# Patient Record
Sex: MALE
[De-identification: names, ages, dates, MRNs, and addresses within clinical notes are randomized per-mention and may not be internally consistent; named-entity substitution may affect disease eponyms.]

---

## 2021-03-02 PROBLEM — Z00.00 ENCOUNTER FOR PREVENTIVE HEALTH EXAMINATION: Status: ACTIVE | Noted: 2021-03-02

## 2021-03-10 ENCOUNTER — APPOINTMENT (OUTPATIENT)
Dept: SURGERY | Facility: CLINIC | Age: 31
End: 2021-03-10
Payer: MEDICAID

## 2021-03-10 ENCOUNTER — TRANSCRIPTION ENCOUNTER (OUTPATIENT)
Age: 31
End: 2021-03-10

## 2021-03-10 ENCOUNTER — LABORATORY RESULT (OUTPATIENT)
Age: 31
End: 2021-03-10

## 2021-03-10 VITALS
BODY MASS INDEX: 24.62 KG/M2 | HEART RATE: 71 BPM | WEIGHT: 198 LBS | HEIGHT: 75 IN | DIASTOLIC BLOOD PRESSURE: 70 MMHG | SYSTOLIC BLOOD PRESSURE: 122 MMHG | TEMPERATURE: 97.8 F

## 2021-03-10 DIAGNOSIS — R22.31 LOCALIZED SWELLING, MASS AND LUMP, RIGHT UPPER LIMB: ICD-10-CM

## 2021-03-10 PROCEDURE — 24075 EXC ARM/ELBOW LES SC < 3 CM: CPT

## 2021-03-10 PROCEDURE — 99072 ADDL SUPL MATRL&STAF TM PHE: CPT

## 2021-03-10 PROCEDURE — 99202 OFFICE O/P NEW SF 15 MIN: CPT | Mod: 57

## 2021-03-10 NOTE — ASSESSMENT
[FreeTextEntry1] : Eric is a 30 year old man who comes to the office with right forearm mass. The mass has been there for many years. Its not bothering him. He states he has a family history of cancer. His father and uncle had melanoma. His sister had lymphoma. \par \par impression : 2x1 cm mass mobile. \par \par We explained in great detail the pathophysiology of the disease process. We serg diagrams and discussed the workup for diagnosis and management.\par The various options were explained to the patient. The Risk , benefit and alternatives were discussed. We discussed recovery and possible complications.\par The Post operative care was explained to the patient. He was counselled on diet , exercise and wound care.\par We discussed the pathology and surgery with him.\par \par A consent was taken before the procedure. \par After a time out and cleaning the area with a antiseptic solution, an injection of local anesthesia the incision was made around the most prominent area with a scalpel. The incision was then made deeper and the lesion was excised from  the surrounded areolar tissue. Caution was taken to not enter the lesion. Once the lesion was removed it was sent of to pathology.  it was approx 1x 2 cm \par The area was irrigated and hemostasis was confirmed. Few areas of bleeding was cauterized.\par \par The incision was closed with absorbable sutures with a combination of Vicryl for deep dermal and Monocryl for the epidermis. Steri-Strips , gauze and tape was put over it.\par \par We discussed the importance of close follow up. \par We informed that he needs to follow up in 2 week.\par We also informed that he can call us if anything changes or has any questions.\par \par \par

## 2021-03-10 NOTE — HISTORY OF PRESENT ILLNESS
[de-identified] : Eric is a 30 year old man who comes to the office with right forearm mass. The mass has been there for many years. Its not bothering him. He states he has a family history of cancer. His father and uncle had melanoma. His sister had lymphoma.

## 2021-03-10 NOTE — PHYSICAL EXAM
[JVD] : no jugular venous distention  [Respiratory Effort] : normal respiratory effort [Normal Rate and Rhythm] : normal rate and rhythm [Purpura] : no purpura  [Alert] : alert [Calm] : calm [de-identified] : Normal [de-identified] : Normal [de-identified] : Normal [de-identified] : 2x1 cm mass mobile.  right forearm

## 2021-03-24 ENCOUNTER — APPOINTMENT (OUTPATIENT)
Dept: SURGERY | Facility: CLINIC | Age: 31
End: 2021-03-24